# Patient Record
Sex: FEMALE | Race: WHITE
[De-identification: names, ages, dates, MRNs, and addresses within clinical notes are randomized per-mention and may not be internally consistent; named-entity substitution may affect disease eponyms.]

---

## 2018-01-05 ENCOUNTER — HOSPITAL ENCOUNTER (EMERGENCY)
Dept: HOSPITAL 91 - E/R | Age: 58
LOS: 1 days | Discharge: HOME | End: 2018-01-06
Payer: COMMERCIAL

## 2018-01-05 ENCOUNTER — HOSPITAL ENCOUNTER (EMERGENCY)
Age: 58
LOS: 1 days | Discharge: HOME | End: 2018-01-06

## 2018-01-05 DIAGNOSIS — E11.9: ICD-10-CM

## 2018-01-05 DIAGNOSIS — Z79.4: ICD-10-CM

## 2018-01-05 DIAGNOSIS — R05: Primary | ICD-10-CM

## 2018-01-05 DIAGNOSIS — Z79.84: ICD-10-CM

## 2018-01-05 PROCEDURE — 99284 EMERGENCY DEPT VISIT MOD MDM: CPT

## 2019-06-18 ENCOUNTER — HOSPITAL ENCOUNTER (EMERGENCY)
Dept: HOSPITAL 10 - E/R | Age: 59
Discharge: HOME | End: 2019-06-18
Payer: COMMERCIAL

## 2019-06-18 ENCOUNTER — HOSPITAL ENCOUNTER (EMERGENCY)
Dept: HOSPITAL 91 - E/R | Age: 59
Discharge: HOME | End: 2019-06-18
Payer: COMMERCIAL

## 2019-06-18 VITALS
HEIGHT: 62 IN | HEIGHT: 62 IN | WEIGHT: 128.31 LBS | BODY MASS INDEX: 23.61 KG/M2 | BODY MASS INDEX: 23.61 KG/M2 | WEIGHT: 128.31 LBS

## 2019-06-18 VITALS — RESPIRATION RATE: 18 BRPM | DIASTOLIC BLOOD PRESSURE: 75 MMHG | HEART RATE: 73 BPM | SYSTOLIC BLOOD PRESSURE: 143 MMHG

## 2019-06-18 DIAGNOSIS — E11.9: ICD-10-CM

## 2019-06-18 DIAGNOSIS — R10.13: Primary | ICD-10-CM

## 2019-06-18 DIAGNOSIS — Z79.4: ICD-10-CM

## 2019-06-18 DIAGNOSIS — R11.0: ICD-10-CM

## 2019-06-18 LAB
ADD MAN DIFF?: YES
ADD UMIC: NO
ALANINE AMINOTRANSFERASE: 17 IU/L (ref 13–69)
ALBUMIN/GLOBULIN RATIO: 1.51
ALBUMIN: 4.4 G/DL (ref 3.3–4.9)
ALKALINE PHOSPHATASE: 100 IU/L (ref 42–121)
ANION GAP: 8 (ref 5–13)
ANISOCYTOSIS: (no result) (ref 0–0)
ASPARTATE AMINO TRANSFERASE: 24 IU/L (ref 15–46)
BILIRUBIN,DIRECT: 0 MG/DL (ref 0–0.2)
BILIRUBIN,TOTAL: 0.5 MG/DL (ref 0.2–1.3)
BLOOD UREA NITROGEN: 10 MG/DL (ref 7–20)
CALCIUM: 9.4 MG/DL (ref 8.4–10.2)
CARBON DIOXIDE: 25 MMOL/L (ref 21–31)
CHLORIDE: 104 MMOL/L (ref 97–110)
CREATININE: 0.52 MG/DL (ref 0.44–1)
EOSINOPHILS % (M): 14 % (ref 0–7)
GLOBULIN: 2.9 G/DL (ref 1.3–3.2)
GLUCOSE: 191 MG/DL (ref 70–220)
HEMATOCRIT: 37.1 % (ref 37–47)
HEMOGLOBIN: 12.5 G/DL (ref 12–16)
IMMATURE GRANS #M: 0.05 10^3/UL (ref 0–0.03)
IMMATURE GRANS % (M): 0.4 % (ref 0–0.43)
LIPASE: 116 U/L (ref 23–300)
LYMPHOCYTES #M: 1.6 10^3/UL (ref 0.8–2.9)
LYMPHOCYTES % (M): 13 % (ref 15–51)
MACROCYTOSIS: (no result) (ref 0–0)
MEAN CORPUSCULAR HEMOGLOBIN: 29.8 PG (ref 29–33)
MEAN CORPUSCULAR HGB CONC: 33.7 G/DL (ref 32–37)
MEAN CORPUSCULAR VOLUME: 88.5 FL (ref 82–101)
MEAN PLATELET VOLUME: 9.8 FL (ref 7.4–10.4)
MICROCYTOSIS: (no result) (ref 0–0)
MONOCYTE #M: 0.3 10^3/UL (ref 0.3–0.9)
MONOCYTES % (M): 3 % (ref 0–11)
NUCLEATED RED BLOOD CELLS%: 0 /100WBC (ref 0–0)
PLATELET COUNT: 399 10^3/UL (ref 140–415)
PLATELET ESTIMATE: NORMAL
POLYCHROMASIA: (no result) (ref 0–0)
POTASSIUM: 3.9 MMOL/L (ref 3.5–5.1)
RED BLOOD COUNT: 4.19 10^6/UL (ref 4.2–5.4)
RED CELL DISTRIBUTION WIDTH: 12.7 % (ref 11.5–14.5)
SEGMENTED NEUTROPHILS (M) %: 70 % (ref 39–77)
SMUDGE%M: 1 % (ref 0–0)
SODIUM: 137 MMOL/L (ref 135–144)
TOTAL PROTEIN: 7.3 G/DL (ref 6.1–8.1)
UR ASCORBIC ACID: NEGATIVE MG/DL
UR BILIRUBIN (DIP): NEGATIVE MG/DL
UR BLOOD (DIP): NEGATIVE MG/DL
UR CLARITY: CLEAR
UR COLOR: YELLOW
UR GLUCOSE (DIP): (no result) MG/DL
UR KETONES (DIP): NEGATIVE MG/DL
UR LEUKOCYTE ESTERASE (DIP): NEGATIVE LEU/UL
UR NITRITE (DIP): NEGATIVE MG/DL
UR PH (DIP): 6 (ref 5–9)
UR SPECIFIC GRAVITY (DIP): 1.02 (ref 1–1.03)
UR TOTAL PROTEIN (DIP): NEGATIVE MG/DL
UR UROBILINOGEN (DIP): NEGATIVE MG/DL
WHITE BLOOD COUNT: 13 10^3/UL (ref 4.8–10.8)

## 2019-06-18 PROCEDURE — 80053 COMPREHEN METABOLIC PANEL: CPT

## 2019-06-18 PROCEDURE — 83690 ASSAY OF LIPASE: CPT

## 2019-06-18 PROCEDURE — 85025 COMPLETE CBC W/AUTO DIFF WBC: CPT

## 2019-06-18 PROCEDURE — 76705 ECHO EXAM OF ABDOMEN: CPT

## 2019-06-18 PROCEDURE — 81003 URINALYSIS AUTO W/O SCOPE: CPT

## 2019-06-18 PROCEDURE — 96375 TX/PRO/DX INJ NEW DRUG ADDON: CPT

## 2019-06-18 PROCEDURE — 99285 EMERGENCY DEPT VISIT HI MDM: CPT

## 2019-06-18 PROCEDURE — 96374 THER/PROPH/DIAG INJ IV PUSH: CPT

## 2019-06-18 PROCEDURE — 36415 COLL VENOUS BLD VENIPUNCTURE: CPT

## 2019-06-18 RX ADMIN — ONDANSETRON HYDROCHLORIDE 1 MG: 2 INJECTION, SOLUTION INTRAMUSCULAR; INTRAVENOUS at 14:26

## 2019-06-18 RX ADMIN — THIAMINE HYDROCHLORIDE 1 MLS/HR: 100 INJECTION, SOLUTION INTRAMUSCULAR; INTRAVENOUS at 14:26

## 2019-06-18 NOTE — ERD
ER Documentation


Chief Complaint


Chief Complaint





epigastric pain w/ vomitting x4 days





HPI


58-year-old female with a history of diabetes and gastritis presenting with 


epigastric abdominal pain for the past 4 days.  The pain is constant, 7 out of 


10, worse with eating and associated with mild nausea.  The pain is aching, 


radiating to the back.  No associated fever, chills, vomiting, melena or 


hematochezia.





ROS


All systems reviewed and are negative except as per history of present illness.





Medications


Home Meds


Active Scripts


Famotidine* (Pepcid*) 20 Mg Tablet, 20 MG PO BID for 7 Days, TAB


   Prov:GLENN GANDARA MD         6/18/19


Reported Medications


Calcium Carbonate/Vitamin D3 (OYSTER SHELL 500 MG + VIT D TB) 1 Each Tablet, 1 


EACH PO DAILY, TAB


   6/18/19


Omeprazole* (Omeprazole*) 20 Mg Capsule.dr, 20 MG PO DAILY, #30 CAP


   6/18/19


Insulin Glargine,Hum.rec.anlog (Basaglar Kwikpen U-100) 100 Unit/1 Ml Insuln.


pen, 14 UNIT SC DAILY, EA


   6/18/19


Simvastatin* (Zocor*) 20 Mg Tablet, 20 MG PO QHS, #30 TAB


   6/18/19


Metformin Hcl* (Metformin Hcl*) 1,000 Mg Tablet, 1000 MG PO WITH BREAKFAST 


DINNE, #60 TAB


   6/18/19


Discontinued Reported Medications


Cyclobenzaprine Hcl* (Cyclobenzaprine Hcl*) 10 Mg Tablet, 10 MG PO QHS, TAB


   7/22/15


Simvastatin (Simvastatin) 20 Mg Tablet, 20 MG PO HS, TAB


   7/22/15


Insulin Glargine* (Lantus*) 100 Unit/Ml Soln, 7 UNIT SC HS, EA


   7/18/15


Glipizide* (Glipizide*) 10 Mg Tablet, 10 MG PO BID, TAB


   7/18/15


Metformin* (Glucophage*) 1,000 Mg Tablet, 1000 MG PO BID


   8/23/12


Discontinued Scripts


Azithromycin* (Zithromax*) 250 Mg Tablet, 250 MG PO .ZPACK AS DIRECTED, #6 TAB


   TAKE 500 MG (2 TABS) THE FIRST DAY THEN 250 MG (1 TAB) DAYS 2-5


   Prov:DOMINGO FRASER PA-C         1/5/18


Benzonatate* (Tessalon Perle*) 100 Mg Capsule, 100 MG PO Q8H PRN for COUGH, #30 


CAP


   Prov:DOMINGO FRASER PA-C         1/5/18


Dextromethorphan Hb-Promethazine Hcl* (Promethazine DM* Syrup) 473 Ml Syrup, 5 


ML PO Q6 PRN for COUGH, #100 ML


   Prov:DOMINGO FRASER PA-C         1/5/18


Acetaminophen* (Tylophen*) 500 Mg Capsule, 1 CAP PO Q6H PRN for PAIN AND OR 


ELEVATED TEMP, #20 CAP


   Prov:JOHAN HOGUE NP         6/28/17


Ibuprofen* (Motrin*) 400 Mg Tab, 400 MG PO Q6H PRN for PAIN AND OR ELEVATED 


TEMP, #30 TAB


   Prov:JOHAN HOGUE NP         6/28/17


Phenazopyridine Hcl* (Pyridium*) 200 Mg Tab, 200 MG PO TID PRN for URINARY PAIN,


#6 TAB


   Prov:JOHAN HOGUE NP         6/28/17


Ciprofloxacin Hcl* (Ciprofloxacin Hcl*) 500 Mg Tablet, 500 MG PO BID for 10 


Days, TAB


   Prov:JOHAN HOGUE NP         6/28/17


Loperamide Hcl* (Imodium*) 2 Mg Capsule, 2 MG PO .AFTER EA LOOSE BM PRN for 


DIARRHEA, #10 TAB


   Prov:SNEHAL CHO MD         7/22/15


Phenazopyridine Hcl* (Pyridium*) 200 Mg Tab, 200 MG PO TID PRN for uti, pain, #6


TAB


   Prov:TEDDY INFANTE         7/18/15


Nitrofurantoin Monohyd Macrocr* (Macrobid*) 100 Mg Capsr, 100 MG PO BID for 14 


Days, CAP


   Prov:TEDDY INFANTE         7/18/15





Allergies


Allergies:  


Coded Allergies:  


     No Known Allergy (Unverified , 6/18/19)





PMhx/Soc


History of Surgery:  No


Anesthesia Reaction:  No


Hx Neurological Disorder:  No


Hx Respiratory Disorders:  No


Hx Cardiac Disorders:  No


Hx Psychiatric Problems:  No


Hx Miscellaneous Medical Probl:  Yes (DM)


Hx Alcohol Use:  No


Hx Substance Use:  No


Hx Tobacco Use:  No


Smoking Status:  Never smoker





FmHx


Family History:  No coronary disease





Physical Exam


Vitals





Vital Signs


  Date      Temp  Pulse  Resp  B/P (MAP)   Pulse Ox  O2          O2 Flow    FiO2


Time                                                 Delivery    Rate


   6/18/19  98.0     73    18      143/75        97  Room Air


     16:44                           (97)


   6/18/19           72    16      152/82        97  Room Air


     14:33                          (105)


   6/18/19  98.7     72    18      150/76        99


     13:31                          (100)





Physical Exam


Const:   No acute distress


Head:   Atraumatic 


Eyes:    Normal Conjunctiva


ENT:    Normal External Ears, Nose and Mouth.


Neck:               Full range of motion. No meningismus.


Resp:   Clear to auscultation bilaterally


Cardio:   Regular rate and rhythm, no murmurs


Abd:    Soft, non tender, non distended.  Negative Finn sign.  No McBurney's 


point tenderness.  No pulsatile mass.  Normal bowel sounds


Skin:   No petechiae or rashes


Back:   No midline or flank tenderness


Ext:    No cyanosis, or edema


Neur:   Awake and alert


Psych:    Normal Mood and Affect


Result Diagram:  


6/18/19 1409                                                                    


           6/18/19 1409





Results 24 hrs





Laboratory Tests


              Test
                                  6/18/19
14:09


              White Blood Count                      13.0 10^3/ul


              Red Blood Count                        4.19 10^6/ul


              Hemoglobin                                12.5 g/dl


              Hematocrit                                   37.1 %


              Mean Corpuscular Volume                     88.5 fl


              Mean Corpuscular Hemoglobin                 29.8 pg


              Mean Corpuscular Hemoglobin
Concent      33.7 g/dl 



              Red Cell Distribution Width                  12.7 %


              Platelet Count                          399 10^3/UL


              Mean Platelet Volume                         9.8 fl


              Immature Granulocytes %                     0.400 %


              Neutrophils %                         %


              Segmented Neutrophils %
(Manual)              70 % 



              Lymphocytes %                         %


              Lymphocytes % (Manual)                         13 %


              Monocytes %                           %


              Monocytes % (Manual)                            3 %


              Eosinophils %                         %


              Eosinophils % (Manual)                         14 %


              Basophils %                           %


              Nucleated Red Blood Cells %             0.0 /100WBC


              Immature Granulocytes #               0.050 10^3/ul


              Neutrophils #                         10^3/ul


              Lymphocytes (Manual)                    1.6 10^3/ul


              Lymphocytes #                         10^3/ul


              Monocytes #                           10^3/ul


              Monocytes # (Manual)                    0.3 10^3/ul


              Eosinophils #                         10^3/ul


              Basophils #                           10^3/ul


              Nucleated Red Blood Cells #           10^3/ul


              Platelet Estimate                    NORMAL


              Polychromasia                                    1+


              Anisocytosis                                     2+


              Microcytosis                                     1+


              Macrocytosis                                     1+


              Urine Color                          YELLOW


              Urine Clarity                        CLEAR


              Urine pH                                        6.0


              Urine Specific Gravity                        1.018


              Urine Ketones                        NEGATIVE mg/dL


              Urine Nitrite                        NEGATIVE mg/dL


              Urine Bilirubin                      NEGATIVE mg/dL


              Urine Urobilinogen                   NEGATIVE mg/dL


              Urine Leukocyte Esterase
            NEGATIVE
[place]/ul


              Urine Hemoglobin                     NEGATIVE mg/dL


              Urine Glucose                              1+ mg/dL


              Urine Total Protein                  NEGATIVE mg/dl


              Sodium Level                             137 mmol/L


              Potassium Level                          3.9 mmol/L


              Chloride Level                           104 mmol/L


              Carbon Dioxide Level                      25 mmol/L


              Anion Gap                                         8


              Blood Urea Nitrogen                        10 mg/dl


              Creatinine                               0.52 mg/dl


              Est Glomerular Filtrat Rate
mL/min   > 60 mL/min 



              Glucose Level                             191 mg/dl


              Calcium Level                             9.4 mg/dl


              Total Bilirubin                           0.5 mg/dl


              Direct Bilirubin                         0.00 mg/dl


              Indirect Bilirubin                        0.5 mg/dl


              Aspartate Amino Transf
(AST/SGOT)          24 IU/L 



              Alanine Aminotransferase
(ALT/SGPT)        17 IU/L 



              Alkaline Phosphatase                       100 IU/L


              Total Protein                              7.3 g/dl


              Albumin                                    4.4 g/dl


              Globulin                                  2.90 g/dl


              Albumin/Globulin Ratio                         1.51


              Lipase                                      116 U/L





Current Medications


 Medications
   Dose
          Sig/Tamara
       Start Time
   Status  Last


 (Trade)       Ordered        Route
 PRN     Stop Time              Admin
Dose


                              Reason                                Admin


 Sodium         1,000 ml @ 
   Q1H STAT
      6/18/19       DC           6/18/19


Chloride       1,000 mls/hr   IV
            14:07
                       14:26



                                             6/18/19 15:06


 Ondansetron    4 mg           ONCE  STAT
    6/18/19       DC           6/18/19


HCl
  (Zofran                 IV
            14:07
                       14:26



Inj)                                         6/18/19 14:09


 Morphine       4 mg           ONCE  STAT
    6/18/19       DC           6/18/19


Sulfate
                      IV
            14:20
                       14:26



(morphine)                                   6/18/19 14:22








Procedures/MDM


EMERGENT LABS AND DIAGNOSTIC STUDIES: 


Lab Results above were reviewed and interpreted by me. 





CBC: Mild leukocytosis, no evidence of anemia


CMP: No evidence of clinically significant electrolyte abnormality, acidosis, 


renal failure, hypoglycemia, liver disease, or biliary obstruction


Lipase: no evidence of pancreatitis 





___________________________________________________________ 


Radiology Results as interpreted by Radiology below were reviewed by DHAVAL Gandara MD: 





Ultrasound gallbladder shows no evidence of stones or other acute abnormality





___________________________________________________________ 


Initial Nursing notes reviewed. 


Previous Medical Records requested via the Electronic Health Record. 





EMERGENCY DEPARTMENT COURSE / MEDICAL DECISION MAKING: 





Differential includes but is not limited to biliary colic, biliary obstruction, 


acute cholecystitis, pancreatitis, hepatitis, gastritis. doubt lower lobe 


pneumonia,  colitis, cardiac pathology, aortic dissection, ureterolithiasis, 


pyelonephritis. Labs were ordered to evaluate for above and were normal. Ultra


sound of the abdomen ordered to evaluate gallbladder and showed no acute 


pathology.  








At this moment the etiology of the abdominal pain is unknown.  The patients 


vitals have been noted and are currently afebrile and hemodynamically stable.  


The workup, physical exam and observation period do not indicate a serious cause


to the pain.  I explained to the patient that her pain is most likely secondary 


to her gastritis versus gastric ulcer.








The patients symptoms have improved while in the ED and the patient remains 


hemodynamically stable. Patient was able to tolerate PO.








The current assessment has been explained to the patient including the fact that


the etiology of the pain cannot be ruled out with certainty.  Patient was 


advised that in the event this is early in the process of a more serious 


condition they may expect their symptoms to worsen and if so to return to the 


emergency department immediately.  Patient was advised to follow up with primary


care physician as soon as possible for re-evaluation within the next 1-2 days.  


All of the patients questions were answered.  Patient verbalized understanding 


of plan and agrees.   Advised to return to the ER for reevaluation within 12 


hours if symptoms worsen. 














Patient's blood pressure was elevated (>120/80) but appears stable without 


evidence of hypertensive emergency or urgency. The patient was counseled about 


the risks of hypertension and urged to pursue outpatient monitoring and therapy 


within a week with their primary care physician.





Departure


Diagnosis:  


   Primary Impression:  


   Epigastric pain


Condition:  Stable











GLENN GANDARA MD         Jun 18, 2019 14:07